# Patient Record
Sex: FEMALE | Race: WHITE | Employment: FULL TIME | URBAN - METROPOLITAN AREA
[De-identification: names, ages, dates, MRNs, and addresses within clinical notes are randomized per-mention and may not be internally consistent; named-entity substitution may affect disease eponyms.]

---

## 2024-01-11 ENCOUNTER — OFFICE VISIT (OUTPATIENT)
Dept: ORTHOPEDIC SURGERY | Age: 34
End: 2024-01-11
Payer: COMMERCIAL

## 2024-01-11 VITALS — WEIGHT: 145 LBS | RESPIRATION RATE: 12 BRPM | HEIGHT: 67 IN | BODY MASS INDEX: 22.76 KG/M2

## 2024-01-11 DIAGNOSIS — M25.552 BILATERAL HIP PAIN: ICD-10-CM

## 2024-01-11 DIAGNOSIS — M25.851 FEMOROACETABULAR IMPINGEMENT OF BOTH HIPS: Primary | ICD-10-CM

## 2024-01-11 DIAGNOSIS — M25.551 BILATERAL HIP PAIN: ICD-10-CM

## 2024-01-11 DIAGNOSIS — M25.852 FEMOROACETABULAR IMPINGEMENT OF BOTH HIPS: Primary | ICD-10-CM

## 2024-01-11 DIAGNOSIS — M62.89 PELVIC FLOOR DYSFUNCTION: ICD-10-CM

## 2024-01-11 PROCEDURE — 99204 OFFICE O/P NEW MOD 45 MIN: CPT | Performed by: ORTHOPAEDIC SURGERY

## 2024-01-11 RX ORDER — OMEPRAZOLE 40 MG/1
40 CAPSULE, DELAYED RELEASE ORAL DAILY
COMMUNITY
Start: 2023-10-18

## 2024-01-11 RX ORDER — ESCITALOPRAM OXALATE 10 MG/1
10 TABLET ORAL DAILY
COMMUNITY
Start: 2023-10-19

## 2024-01-11 NOTE — PROGRESS NOTES
Left, Right  Severity of Pain: 6  Quality of Pain: Aching, Dull, Throbbing, Other (Comment) (Pinching)  Duration of Pain: Persistent  Frequency of Pain: Constant  Aggravating Factors: Exercise, Bending, Stretching, Straightening  Limiting Behavior: Yes  Relieving Factors: Rest  Result of Injury: No  Work-Related Injury: No  Are there other pain locations you wish to document?: No  ROS: Review of systems reviewed from Patient History Form completed today and available in the patient's chart under the Media tab.       History reviewed. No pertinent past medical history.     History reviewed. No pertinent surgical history.    History reviewed. No pertinent family history.    Social History     Socioeconomic History    Marital status: Single     Spouse name: None    Number of children: None    Years of education: None    Highest education level: None   Tobacco Use    Smoking status: Never    Smokeless tobacco: Never   Substance and Sexual Activity    Alcohol use: Yes    Drug use: Never       Current Outpatient Medications   Medication Sig Dispense Refill    escitalopram (LEXAPRO) 10 MG tablet Take 1 tablet by mouth daily      omeprazole (PRILOSEC) 40 MG delayed release capsule Take 1 capsule by mouth daily       No current facility-administered medications for this visit.       No Known Allergies    Vital signs:  Resp 12   Ht 1.702 m (5' 7\")   Wt 65.8 kg (145 lb)   BMI 22.71 kg/m²        Constitutional: The physical examination finds the patient to be well-developed and well-nourished.  The patient is alert and oriented x3 and was cooperative throughout the visit.  Neuro: no focal deficits noted. Normal mood, judgement, decision making  Eyes: sclera clear, EOMI  Ears: Normal external ear  Mouth:  No perioral lesions  Pulm: Respirations unlabored and regular  Pulse: Extremities well perfused, warm, capillary refill < 2 seconds  Musculoskeletal:        Hip Examination: bilateral    Skin/Inspection: No skin lesions,

## 2024-01-16 ENCOUNTER — HOSPITAL ENCOUNTER (OUTPATIENT)
Dept: PHYSICAL THERAPY | Age: 34
Setting detail: THERAPIES SERIES
Discharge: HOME | End: 2024-01-16
Attending: ORTHOPAEDIC SURGERY
Payer: COMMERCIAL

## 2024-01-16 DIAGNOSIS — M25.552 LEFT HIP PAIN: Primary | ICD-10-CM

## 2024-01-16 DIAGNOSIS — M25.551 RIGHT HIP PAIN: ICD-10-CM

## 2024-01-16 PROCEDURE — 97110 THERAPEUTIC EXERCISES: CPT

## 2024-01-16 PROCEDURE — 97161 PT EVAL LOW COMPLEX 20 MIN: CPT

## 2024-01-16 NOTE — PLAN OF CARE
structures, functions (impairments), activity limitations, and/or participation restrictions  [x] A clinical presentation with stable and/or uncomplicated characteristics   [x] Clinical decision making of LOW (46274 - Typically 20 minutes face-to-face) complexity using standardized patient assessment instrument and/or measurable assessment of functional outcome.    EVAL LOW [60245] (typically 20 minutes face-to-face)    GOALS     HEP instruction: [employer] not working this date, will update next session (see scanned forms)    GOALS:  Patient stated goal: reduce pain, return to recreational activities painfree     [] Progressing: [] Met: [] Not Met: [] Adjusted    Therapist goals for Patient:   Short Term Goals: To be achieved in: 2 weeks  1. Independent in HEP and progression per patient tolerance, in order to prevent re-injury.     [] Progressing: [] Met: [] Not Met: [] Adjusted   2. Patient will have a decrease in pain to facilitate improvement in movement, function, and ADLs as indicated by Functional Deficits.    [] Progressing: [] Met: [] Not Met: [] Adjusted    Long Term Goals: To be achieved in: 6-8 weeks  1. Disability index score of 25% or less for the IHOT to assist with reaching prior level of function.   [] Progressing: [] Met: [] Not Met: [] Adjusted  2. Patient will demonstrate increased AROM to 100+ hip flexion, 20+ IR  to allow for proper joint functioning as indicated by patients Functional Deficits.    [] Progressing: [] Met: [] Not Met: [] Adjusted  3. Patient will demonstrate an increase in Strength to 4+/5 to allow for proper functional mobility as indicated by patients Functional Deficits.   [] Progressing: [] Met: [] Not Met: [] Adjusted  4. Patient will return to sit/stand for greater than 1 hour  without increased symptoms or restriction.   [] Progressing: [] Met: [] Not Met: [] Adjusted  5. Patient will return to recreational activities such as pilates class with increased symptoms or

## 2024-01-16 NOTE — FLOWSHEET NOTE
to lack of progress  [] Patient is not progressing as expected and requires additional follow up with physician  [] Other:         CHARGE CAPTURE     CHARGE GRID   CPT Code (TIMED) # CPT Code (UNTIMED) #     [x] Therex (95728)  1  [x] EVAL: Low 1    [] Neuromusc. Re-ed (69460)   [] Re-Eval (28279)     [] Manual (11624)   [] Estim Unattended (90899)     [] Ther. Act (60952)   [] Mech. Traction (38042)     [] Gait (36534)   [] Dry Needle 1-2 muscle (20560)     [] Aquatic Therex (17157)   [] Dry Needle 3+ muscle (20561)     [] Iontophoresis (05339)   [] VASO (40050)     [] Ultrasound (82209)   [] Group Therapy (28185)     [] Estim Attended (89470)   [] Other:    Total Timed Code Tx Minutes 20       Total Treatment Minutes 42     Charge Justification:  (46632) THERAPEUTIC EXERCISE - Provided verbal/tactile cueing for activities related to strengthening, flexibility, endurance, ROM performed to prevent loss of range of motion, maintain or improve muscular strength or increase flexibility, following either an injury or surgery.   (44637) HOME EXERCISE PROGRAM- Reviewed/Progressed HEP activities related to strengthening, flexibility, endurance, ROM performed to prevent loss of range of motion, maintain or improve muscular strength or increase flexibility, following either an injury or surgery.  (82845) NEUROMUSCULAR RE-EDUCATION- Therapeutic procedure, 1 or more areas, each 15 minutes; neuromuscular reeducation of movement, balance, coordination, kinesthetic sense, posture, and/or proprioception for sitting and/or standing activities  (08382)HOME EXERCISE PROGRAM- Reviewed/Progressed HEP activities related to neuromuscular reeducation of movement, balance, coordination, kinesthetic sense, posture, and/or proprioception for sitting and/or standing activities    (74614) THERAPEUTIC ACTIVITY- use of dynamic activities to improve functional performance. (Ex include squatting, ascending/descending stairs, walking, bending,

## 2024-01-19 ENCOUNTER — HOSPITAL ENCOUNTER (OUTPATIENT)
Dept: PHYSICAL THERAPY | Age: 34
Setting detail: THERAPIES SERIES
Discharge: HOME | End: 2024-01-19
Attending: ORTHOPAEDIC SURGERY
Payer: COMMERCIAL

## 2024-01-19 PROCEDURE — 97140 MANUAL THERAPY 1/> REGIONS: CPT

## 2024-01-19 PROCEDURE — 97110 THERAPEUTIC EXERCISES: CPT

## 2024-01-19 NOTE — FLOWSHEET NOTE
including deductible  and allowable visit number. Pt was informed of possible out of pocket costs, as well as, informed of other service options for continuing supervised sessions without required skilled PT intervention such as the cash based GAP program.   TREATMENT PLAN   Plan: POC Initiated today- see eval for details  Note: If patient does not return for scheduled/recommended follow up visits, this note will serve as a discharge from care along with the most recent update on progress.       Electronically Signed by Kay Aviles, MARICHUY Aviles PT, DPT, Cert DN                Date: 01/19/2024

## 2024-01-23 ENCOUNTER — HOSPITAL ENCOUNTER (OUTPATIENT)
Dept: PHYSICAL THERAPY | Age: 34
Setting detail: THERAPIES SERIES
Discharge: HOME | End: 2024-01-23
Attending: ORTHOPAEDIC SURGERY
Payer: COMMERCIAL

## 2024-01-23 PROCEDURE — 97110 THERAPEUTIC EXERCISES: CPT

## 2024-01-23 PROCEDURE — 97112 NEUROMUSCULAR REEDUCATION: CPT

## 2024-01-23 PROCEDURE — 97140 MANUAL THERAPY 1/> REGIONS: CPT

## 2024-01-23 NOTE — FLOWSHEET NOTE
Bridge with Hip Abduction and Resistance  - 1 x daily - 7 x weekly - 3 sets - 5 reps - 5 hold  - Supine Hip Flexion with Resistance Loop  - 1 x daily - 7 x weekly - 3 sets - 5 reps  - Supine March with Alternating Leg Lifts  - 1 x daily - 7 x weekly - 3 sets - 5 reps  - Sidestepping in Squat with Resistance and Arms Forward  - 1 x daily - 7 x weekly - 3 laps (8-10 steps each way)  ASSESSMENT     Today's Assessment: See Eval    Medical Necessity Documentation:  I certify that this patient meets the below criteria necessary for medical necessity for care and/or justification of therapy services:  The patient has functional impairments and/or activity limitations and would benefit from continued outpatient therapy services to address the deficits outlined in the patients goals    Treatment/Activity Tolerance:  [x] Patient tolerated treatment well [] Patient limited by fatique  [] Patient limited by pain  [] Patient limited by other medical complications  [] Other:  pt notes long axis and lateral hip traction are helpful to wilmer hip today. She was able to progress hip and glut strengthening this date without symptoms. Pt will continue to require skilled interventon in order to improve hip strength/stability to return to PLOF.     Return to Play: NA    Prognosis for POC: [x] Good [] Fair  [] Poor    Patient requires continued skilled intervention: [x] Yes  [] No      GOALS       GOALS:  Patient stated goal: reduce pain, return to recreational activities painfree     [] Progressing: [] Met: [] Not Met: [] Adjusted     Therapist goals for Patient:   Short Term Goals: To be achieved in: 2 weeks  1. Independent in HEP and progression per patient tolerance, in order to prevent re-injury.     [] Progressing: [] Met: [] Not Met: [] Adjusted   2. Patient will have a decrease in pain to facilitate improvement in movement, function, and ADLs as indicated by Functional Deficits.    [] Progressing: [] Met: [] Not Met: [] Adjusted

## 2024-01-26 ENCOUNTER — APPOINTMENT (OUTPATIENT)
Dept: PHYSICAL THERAPY | Age: 34
End: 2024-01-26
Attending: ORTHOPAEDIC SURGERY
Payer: COMMERCIAL

## 2024-01-31 ENCOUNTER — HOSPITAL ENCOUNTER (OUTPATIENT)
Dept: PHYSICAL THERAPY | Age: 34
Setting detail: THERAPIES SERIES
Discharge: HOME | End: 2024-01-31
Attending: ORTHOPAEDIC SURGERY
Payer: COMMERCIAL

## 2024-01-31 PROCEDURE — 97530 THERAPEUTIC ACTIVITIES: CPT

## 2024-01-31 PROCEDURE — 97110 THERAPEUTIC EXERCISES: CPT

## 2024-01-31 NOTE — FLOWSHEET NOTE
- 1 x daily - 7 x weekly - 3 sets - 5 reps  - Supine March with Alternating Leg Lifts  - 1 x daily - 7 x weekly - 3 sets - 5 reps  - Sidestepping in Squat with Resistance and Arms Forward  - 1 x daily - 7 x weekly - 3 laps (8-10 steps each way)  ASSESSMENT     Today's Assessment: See Eval    Medical Necessity Documentation:  I certify that this patient meets the below criteria necessary for medical necessity for care and/or justification of therapy services:  The patient has functional impairments and/or activity limitations and would benefit from continued outpatient therapy services to address the deficits outlined in the patients goals    Treatment/Activity Tolerance:  [x] Patient tolerated treatment well [] Patient limited by fatique  [] Patient limited by pain  [] Patient limited by other medical complications  [] Other:  She was able to progress hip and glut strengthening this date without symptoms. Pt will continue to require skilled interventon in order to improve hip strength/stability to return to PLOF.     Return to Play: NA    Prognosis for POC: [x] Good [] Fair  [] Poor    Patient requires continued skilled intervention: [x] Yes  [] No      GOALS       GOALS:  Patient stated goal: reduce pain, return to recreational activities painfree     [] Progressing: [] Met: [] Not Met: [] Adjusted     Therapist goals for Patient:   Short Term Goals: To be achieved in: 2 weeks  1. Independent in HEP and progression per patient tolerance, in order to prevent re-injury.     [] Progressing: [] Met: [] Not Met: [] Adjusted   2. Patient will have a decrease in pain to facilitate improvement in movement, function, and ADLs as indicated by Functional Deficits.    [] Progressing: [] Met: [] Not Met: [] Adjusted     Long Term Goals: To be achieved in: 6-8 weeks  1. Disability index score of 25% or less for the IHOT to assist with reaching prior level of function.   [] Progressing: [] Met: [] Not Met: [] Adjusted  2. Patient

## 2024-02-07 ENCOUNTER — APPOINTMENT (OUTPATIENT)
Dept: PHYSICAL THERAPY | Age: 34
End: 2024-02-07
Attending: ORTHOPAEDIC SURGERY
Payer: COMMERCIAL

## 2024-02-07 ENCOUNTER — HOSPITAL ENCOUNTER (OUTPATIENT)
Dept: PHYSICAL THERAPY | Age: 34
Setting detail: THERAPIES SERIES
Discharge: HOME | End: 2024-02-07
Attending: ORTHOPAEDIC SURGERY
Payer: COMMERCIAL

## 2024-02-07 PROCEDURE — 97110 THERAPEUTIC EXERCISES: CPT

## 2024-02-07 PROCEDURE — 97530 THERAPEUTIC ACTIVITIES: CPT

## 2024-02-07 NOTE — PLAN OF CARE
[facility] a [address] [place] [address]  Office: [phone]  Fax:  [phone]  Physical Therapy Re-Certification Plan of Care/MD UPDATE      Dear  Dr. Smith,     We had the pleasure of treating the following patient for physical therapy services at [facility].  A summary of our findings can be found in the updated assessment below.  This includes our plan of care.  If you have any questions or concerns regarding these findings, please do not hesitate to contact me at the office phone number checked above.  Thank you for the referral.     Physician Signature:________________________________Date:__________________  By signing above (or electronic signature), therapist’s plan is approved by physician    Date Range Of Visits: 24-24  Total Visits to Date: 5  Overall Response to Treatment:   []Patient is responding well to treatment and improvement is noted with regards  to goals   []Patient should continue to improve in reasonable time if they continue HEP   []Patient has plateaued and is no longer responding to skilled PT intervention    []Patient is getting worse and would benefit from return to referring MD   []Patient unable to adhere to initial POC   []Other: pt exhibits improved ROM, strength and flexibility. She does however have some remaining deficits in hip flexor and ER/IR strength. She notes ADLs and ambulation have improved, does have some remaining 'pinching' in anterior hips occasionally. Pt would continue to benefit from skilled intervention 1x per week for 3-4 weeks to progress towards independent HEP and resume recreational activities limitation free.                 Physical Therapy: TREATMENT/PROGRESS NOTE   Patient: Susan Islas (34 y.o. female)   Treatment Date: 2024   :  1990 MRN: [id number]   Visit #: 5

## 2024-02-14 ENCOUNTER — HOSPITAL ENCOUNTER (OUTPATIENT)
Dept: PHYSICAL THERAPY | Age: 34
Setting detail: THERAPIES SERIES
Discharge: HOME | End: 2024-02-14
Attending: ORTHOPAEDIC SURGERY
Payer: COMMERCIAL

## 2024-02-14 PROCEDURE — 97110 THERAPEUTIC EXERCISES: CPT

## 2024-02-14 PROCEDURE — 97530 THERAPEUTIC ACTIVITIES: CPT

## 2024-02-14 NOTE — FLOWSHEET NOTE
lifting and ambulation    Reviewed insurance benefits for physical therapy in an outpatient hospital based setting with the patient, including deductible  and allowable visit number. Pt was informed of possible out of pocket costs, as well as, informed of other service options for continuing supervised sessions without required skilled PT intervention such as the cash based GAP program.   TREATMENT PLAN   Plan: POC Initiated today- see eval for details  Note: If patient does not return for scheduled/recommended follow up visits, this note will serve as a discharge from care along with the most recent update on progress.       Electronically Signed by MARICHUY Hurd PT, DPT, Cert DN                Date: 02/14/2024

## 2024-02-29 ENCOUNTER — OFFICE VISIT (OUTPATIENT)
Dept: ORTHOPEDIC SURGERY | Age: 34
End: 2024-02-29
Payer: COMMERCIAL

## 2024-02-29 VITALS — BODY MASS INDEX: 22.76 KG/M2 | WEIGHT: 145 LBS | HEIGHT: 67 IN

## 2024-02-29 DIAGNOSIS — M25.551 BILATERAL HIP PAIN: ICD-10-CM

## 2024-02-29 DIAGNOSIS — M25.852 FEMOROACETABULAR IMPINGEMENT OF BOTH HIPS: Primary | ICD-10-CM

## 2024-02-29 DIAGNOSIS — M25.552 BILATERAL HIP PAIN: ICD-10-CM

## 2024-02-29 DIAGNOSIS — M25.851 FEMOROACETABULAR IMPINGEMENT OF BOTH HIPS: Primary | ICD-10-CM

## 2024-02-29 PROCEDURE — 99213 OFFICE O/P EST LOW 20 MIN: CPT | Performed by: ORTHOPAEDIC SURGERY

## 2024-02-29 NOTE — PROGRESS NOTES
Chief Complaint  Follow-up (BILATERAL HIPS)      History of Present Illness:  Susan Islas is a pleasant 34 y.o. female here for reassessment of left greater than right hip pain.  She has been taking over-the-counter turmeric and has been doing formal physical therapy which has significantly improved her symptoms.  Her pain assessment is documented below.    She works at  at the business office.       Medical History:  Patient's medications, allergies, past medical, surgical, social and family histories were reviewed and updated as appropriate.    Pain Assessment  Location of Pain: Hip  Location Modifiers: Right, Left  Severity of Pain: 1  Quality of Pain: Other (Comment) (pinching)  Frequency of Pain: Intermittent  Limiting Behavior: Some  Relieving Factors: Exercise  Result of Injury: No  Work-Related Injury: No  Are there other pain locations you wish to document?: No  ROS: Review of systems reviewed from Patient History Form completed today and available in the patient's chart under the Media tab.      Pertinent items are noted in HPI  Review of systems reviewed from Patient History Form completed today and available in the patient's chart under the Media tab.       Vital Signs:  Ht 1.702 m (5' 7\")   Wt 65.8 kg (145 lb)   BMI 22.71 kg/m²         Neuro: Alert & oriented x 3,  normal,  no focal deficits noted. Normal affect.  Eyes: sclera clear  Ears: Normal external ear  Mouth:  No perioral lesions  Pulm: Respirations unlabored and regular  Pulse: Extremities well perfused. 2+ peripheral pulses.  Skin: Warm. No ulcerations.      Constitutional: The physical examination finds the patient to be well-developed and well-nourished.  The patient is alert and oriented x3 and was cooperative throughout the visit.    Hip Examination: left    Skin/Inspection: No skin lesions, cellulitis, or extreme edema in the lower extremities.     Standing/Walking: normal gait, negative Trendelenburg sign.      Supine/Side